# Patient Record
Sex: MALE | Race: WHITE | Employment: FULL TIME | URBAN - METROPOLITAN AREA
[De-identification: names, ages, dates, MRNs, and addresses within clinical notes are randomized per-mention and may not be internally consistent; named-entity substitution may affect disease eponyms.]

---

## 2019-12-23 ENCOUNTER — HOSPITAL ENCOUNTER (EMERGENCY)
Age: 54
Discharge: HOME | End: 2019-12-23
Attending: EMERGENCY MEDICINE
Payer: COMMERCIAL

## 2019-12-23 ENCOUNTER — APPOINTMENT (OUTPATIENT)
Dept: GENERAL RADIOLOGY | Age: 54
End: 2019-12-23
Attending: PHYSICIAN ASSISTANT
Payer: COMMERCIAL

## 2019-12-23 VITALS
WEIGHT: 204 LBS | SYSTOLIC BLOOD PRESSURE: 191 MMHG | DIASTOLIC BLOOD PRESSURE: 95 MMHG | TEMPERATURE: 98 F | HEIGHT: 69 IN | RESPIRATION RATE: 18 BRPM | OXYGEN SATURATION: 99 % | HEART RATE: 77 BPM | BODY MASS INDEX: 30.21 KG/M2

## 2019-12-23 DIAGNOSIS — I10 HYPERTENSION, UNSPECIFIED TYPE: Primary | ICD-10-CM

## 2019-12-23 PROCEDURE — 93005 ELECTROCARDIOGRAM TRACING: CPT

## 2019-12-23 PROCEDURE — 74011250637 HC RX REV CODE- 250/637: Performed by: PHYSICIAN ASSISTANT

## 2019-12-23 PROCEDURE — 71046 X-RAY EXAM CHEST 2 VIEWS: CPT

## 2019-12-23 PROCEDURE — 99284 EMERGENCY DEPT VISIT MOD MDM: CPT

## 2019-12-23 RX ORDER — HYDROCHLOROTHIAZIDE 25 MG/1
25 TABLET ORAL
Status: COMPLETED | OUTPATIENT
Start: 2019-12-23 | End: 2019-12-23

## 2019-12-23 RX ORDER — HYDROCHLOROTHIAZIDE 25 MG/1
25 TABLET ORAL DAILY
Qty: 30 TAB | Refills: 0 | Status: SHIPPED | OUTPATIENT
Start: 2019-12-23 | End: 2020-01-22 | Stop reason: SDUPTHER

## 2019-12-23 RX ADMIN — HYDROCHLOROTHIAZIDE 25 MG: 25 TABLET ORAL at 17:10

## 2019-12-23 NOTE — DISCHARGE INSTRUCTIONS

## 2019-12-23 NOTE — ED TRIAGE NOTES
Patient reports intermittent chest pain and shortness of breath over the last few weeks. Patient was sent by [facility] for A-Flutter reading on his EKG- Patient arrived with a NSR EKG.

## 2019-12-23 NOTE — ED NOTES
Diego ABRAHAM at chairside to review d/c instructions and prescription(s) with patient and self. Opportunity for clarification given. No further questions/concerns voiced at this time. Patient is alert and remains in no acute distress.

## 2019-12-23 NOTE — ED PROVIDER NOTES
62 y.o. male with no significant past medical history who presents from home with chief complaint of chest pain. Patient states for the past 3 or 4 weeks he's been feeling intermittent chest pain and shortness of breath. Patient also reports a few episodes of diaphoresis over the past 4 weeks. Patient went to [facility] today, had a BP of 572 systolic, and was referred her for further evaluation due to an abnormal EKG. Patient presents to [facility] ED c/o intermittent chest pain described as \"dull\" and SOB. Patient denies nausea, vomiting, and fever. There are no other acute medical concerns at this time. Note written by Myesha Addison, as dictated by JARAD Queen at 4:04 PM     The history is provided by the patient. No  was used. No past medical history on file. No past surgical history on file. No family history on file.     Social History     Socioeconomic History    Marital status: Not on file     Spouse name: Not on file    Number of children: Not on file    Years of education: Not on file    Highest education level: Not on file   Occupational History    Not on file   Social Needs    Financial resource strain: Not on file    Food insecurity:     Worry: Not on file     Inability: Not on file    Transportation needs:     Medical: Not on file     Non-medical: Not on file   Tobacco Use    Smoking status: Not on file   Substance and Sexual Activity    Alcohol use: Not on file    Drug use: Not on file    Sexual activity: Not on file   Lifestyle    Physical activity:     Days per week: Not on file     Minutes per session: Not on file    Stress: Not on file   Relationships    Social connections:     Talks on phone: Not on file     Gets together: Not on file     Attends Hindu service: Not on file     Active member of club or organization: Not on file     Attends meetings of clubs or organizations: Not on file     Relationship status: Not on file   Isha Garcia Intimate partner violence:     Fear of current or ex partner: Not on file     Emotionally abused: Not on file     Physically abused: Not on file     Forced sexual activity: Not on file   Other Topics Concern    Not on file   Social History Narrative    Not on file         ALLERGIES: Patient has no known allergies. Review of Systems   Constitutional: Positive for diaphoresis. Negative for chills and fever. Respiratory: Positive for shortness of breath. Cardiovascular: Positive for chest pain. Gastrointestinal: Negative for nausea and vomiting. Musculoskeletal: Negative for myalgias. Neurological: Negative for syncope and light-headedness. All other systems reviewed and are negative. Vitals:    12/23/19 1606   BP: (!) 202/102   Pulse: 81   Resp: 16   Temp: 98 °F (36.7 °C)   SpO2: 100%   Weight: 92.5 kg (204 lb)   Height: 5' 9\" (1.753 m)            Physical Exam  Vitals signs and nursing note reviewed. Constitutional:       General: He is not in acute distress. Appearance: He is well-developed. He is not diaphoretic. HENT:      Head: Normocephalic and atraumatic. Eyes:      Conjunctiva/sclera: Conjunctivae normal.   Neck:      Musculoskeletal: Normal range of motion and neck supple. Cardiovascular:      Rate and Rhythm: Normal rate and regular rhythm. Heart sounds: Normal heart sounds. Pulmonary:      Effort: Pulmonary effort is normal.      Breath sounds: Normal breath sounds. Skin:     General: Skin is warm and dry. Neurological:      Mental Status: He is alert and oriented to person, place, and time.           MDM  Number of Diagnoses or Management Options  Hypertension, unspecified type:      Amount and/or Complexity of Data Reviewed  Tests in the radiology section of CPT®: ordered and reviewed  Discuss the patient with other providers: yes (Dr. Reinaldo Floyd, ED attending)  Independent visualization of images, tracings, or specimens: yes (CXR)    Patient Progress  Patient progress: stable         Procedures  ED EKG interpretation:  Rhythm: normal sinus rhythm; and regular . Rate (approx.): 76; Axis: normal; ST/T wave: normal.  Interpreted by Dr. Promise Lamas, 4:22 PM        62 y.o. male with no significant past medical history who presents from home with chief complaint of chest pain. The patient is well-appearing in no acute distress. EKG without acute ischemic changes or arrhythmia. Labs from [facility] include negative troponin, negative d-dimer, normal renal function. CXR, read by radiology and independently visualized and interpreted by myself, reveals no evidence of pulmonary edema, cardiomegaly or other acute abnormalities. Safe for discharge home with Rx for HCTZ, with instructions for prompt PCP and cardiology follow up. Strict ED return precautions discussed and provided in writing at time of discharge. The patient verbalized understanding and agreement with this plan.

## 2019-12-24 LAB
ATRIAL RATE: 76 BPM
CALCULATED P AXIS, ECG09: 59 DEGREES
CALCULATED R AXIS, ECG10: 1 DEGREES
CALCULATED T AXIS, ECG11: 22 DEGREES
DIAGNOSIS, 93000: NORMAL
P-R INTERVAL, ECG05: 142 MS
Q-T INTERVAL, ECG07: 382 MS
QRS DURATION, ECG06: 88 MS
QTC CALCULATION (BEZET), ECG08: 429 MS
VENTRICULAR RATE, ECG03: 76 BPM

## 2020-01-03 ENCOUNTER — TELEPHONE (OUTPATIENT)
Dept: CARDIOLOGY CLINIC | Age: 55
End: 2020-01-03

## 2020-01-03 NOTE — TELEPHONE ENCOUNTER
Patient referred from ER for chest pain. Have asked PSR to contact patient and schedule new patient appointment with one of our providers for further evaluation.

## 2020-01-13 NOTE — TELEPHONE ENCOUNTER
Per Hue Sterling, PSR     \"Hello,     Left voicemail on patient Home/Mobile requesting a return call to schedule New Patient appointment w/ (ANY) provider \"first available\" for chest pain & HTN, negative troponin & no significant ECG abnormalities. \"

## 2020-01-22 ENCOUNTER — OFFICE VISIT (OUTPATIENT)
Dept: FAMILY MEDICINE CLINIC | Age: 55
End: 2020-01-22

## 2020-01-22 ENCOUNTER — HOSPITAL ENCOUNTER (OUTPATIENT)
Dept: LAB | Age: 55
Discharge: HOME | End: 2020-01-22

## 2020-01-22 VITALS
TEMPERATURE: 97.9 F | SYSTOLIC BLOOD PRESSURE: 142 MMHG | DIASTOLIC BLOOD PRESSURE: 80 MMHG | RESPIRATION RATE: 17 BRPM | HEIGHT: 69 IN | OXYGEN SATURATION: 99 % | HEART RATE: 75 BPM | WEIGHT: 202 LBS | BODY MASS INDEX: 29.92 KG/M2

## 2020-01-22 DIAGNOSIS — I10 ESSENTIAL HYPERTENSION: ICD-10-CM

## 2020-01-22 DIAGNOSIS — Z00.00 ENCOUNTER FOR WELLNESS EXAMINATION: Primary | ICD-10-CM

## 2020-01-22 DIAGNOSIS — Z00.00 ENCOUNTER FOR WELLNESS EXAMINATION: ICD-10-CM

## 2020-01-22 PROBLEM — F41.9 ANXIETY: Status: ACTIVE | Noted: 2020-01-22

## 2020-01-22 PROBLEM — N40.0 BPH (BENIGN PROSTATIC HYPERPLASIA): Status: ACTIVE | Noted: 2020-01-22

## 2020-01-22 PROBLEM — N52.9 ERECTILE DYSFUNCTION: Status: ACTIVE | Noted: 2020-01-22

## 2020-01-22 LAB
ALBUMIN SERPL-MCNC: 4.4 G/DL (ref 3.5–5)
ALBUMIN UR QL STRIP: 10 MG/L
ALBUMIN/GLOB SERPL: 1.6 {RATIO} (ref 1.1–2.2)
ALP SERPL-CCNC: 98 U/L (ref 45–117)
ALT SERPL-CCNC: 30 U/L (ref 12–78)
ANION GAP SERPL CALC-SCNC: 8 MMOL/L (ref 5–15)
AST SERPL-CCNC: 19 U/L (ref 15–37)
BILIRUB SERPL-MCNC: 0.7 MG/DL (ref 0.2–1)
BUN SERPL-MCNC: 10 MG/DL (ref 6–20)
BUN/CREAT SERPL: 11 (ref 12–20)
CALCIUM SERPL-MCNC: 9.7 MG/DL (ref 8.5–10.1)
CHLORIDE SERPL-SCNC: 88 MMOL/L (ref 97–108)
CHOLEST SERPL-MCNC: 209 MG/DL
CO2 SERPL-SCNC: 29 MMOL/L (ref 21–32)
CREAT SERPL-MCNC: 0.91 MG/DL (ref 0.7–1.3)
CREATININE, URINE POC: 200 MG/DL
ERYTHROCYTE [DISTWIDTH] IN BLOOD BY AUTOMATED COUNT: 11.7 % (ref 11.5–14.5)
EST. AVERAGE GLUCOSE BLD GHB EST-MCNC: 103 MG/DL
GLOBULIN SER CALC-MCNC: 2.8 G/DL (ref 2–4)
GLUCOSE SERPL-MCNC: 100 MG/DL (ref 65–100)
HBA1C MFR BLD: 5.2 % (ref 4–5.6)
HCT VFR BLD AUTO: 44.2 % (ref 36.6–50.3)
HCV AB SERPL QL IA: NONREACTIVE
HCV COMMENT,HCGAC: NORMAL
HDLC SERPL-MCNC: 69 MG/DL
HDLC SERPL: 3 {RATIO} (ref 0–5)
HGB BLD-MCNC: 15.3 G/DL (ref 12.1–17)
LDLC SERPL CALC-MCNC: 122.4 MG/DL (ref 0–100)
LIPID PROFILE,FLP: ABNORMAL
MCH RBC QN AUTO: 31.4 PG (ref 26–34)
MCHC RBC AUTO-ENTMCNC: 34.6 G/DL (ref 30–36.5)
MCV RBC AUTO: 90.6 FL (ref 80–99)
MICROALBUMIN/CREAT RATIO POC: <30 MG/G
NRBC # BLD: 0 K/UL (ref 0–0.01)
NRBC BLD-RTO: 0 PER 100 WBC
PLATELET # BLD AUTO: 372 K/UL (ref 150–400)
PMV BLD AUTO: 10.1 FL (ref 8.9–12.9)
POTASSIUM SERPL-SCNC: 4.7 MMOL/L (ref 3.5–5.1)
PROT SERPL-MCNC: 7.2 G/DL (ref 6.4–8.2)
RBC # BLD AUTO: 4.88 M/UL (ref 4.1–5.7)
SODIUM SERPL-SCNC: 125 MMOL/L (ref 136–145)
TRIGL SERPL-MCNC: 88 MG/DL (ref ?–150)
VLDLC SERPL CALC-MCNC: 17.6 MG/DL
WBC # BLD AUTO: 7.8 K/UL (ref 4.1–11.1)

## 2020-01-22 RX ORDER — TAMSULOSIN HYDROCHLORIDE 0.4 MG/1
CAPSULE ORAL
COMMUNITY
Start: 2020-01-16

## 2020-01-22 RX ORDER — ALPRAZOLAM 2 MG/1
TABLET, EXTENDED RELEASE ORAL
COMMUNITY
Start: 2020-01-19

## 2020-01-22 RX ORDER — HYDROCHLOROTHIAZIDE 25 MG/1
25 TABLET ORAL DAILY
Qty: 90 TAB | Refills: 0 | Status: SHIPPED | OUTPATIENT
Start: 2020-01-22 | End: 2020-02-25

## 2020-01-22 RX ORDER — BISMUTH SUBSALICYLATE 262 MG
1 TABLET,CHEWABLE ORAL DAILY
COMMUNITY

## 2020-01-22 RX ORDER — TADALAFIL 20 MG/1
20 TABLET ORAL AS NEEDED
COMMUNITY

## 2020-01-22 NOTE — PROGRESS NOTES
Chief Complaint   Patient presents with   Casper Shaker Establish Care     1. Have you been to the ER, urgent care clinic since your last visit? Hospitalized since your last visit? No    2. Have you seen or consulted any other health care providers outside of the [address] [place] [address] since your last visit? Include any pap smears or colon screening.  No [facility]--iiv84--793/110    Had some high bp ---hctz    Had a [facility] test yesterday[facility]      avg bp--138/92    Scheduled for an echo and calcium    tdap--2018    Colonoscopy--referral needed    Shingles vaccine--would like script    Fasting for labwork

## 2020-01-22 NOTE — PROGRESS NOTES
Subjective:   Jeane Inman is an 62 y.o. male who presents for complete physical exam. H/o BPH, anxiety (followed by [facility]),  and newly diagnosed HTN started on HCTZ 25mg diagnosed after a visit to the ED 1 month ago for chest pain. Pt is new to the practice and is currently being follow by Dr. Maximilian Maciel at [facility] after his recent ED visit. He had a normal stress test yesterday and is scheduled for an Echo and calcium test the first week of February. Pt checks his BP at home and states it has been significantly improved since starting the HCTZ. Today patient requests a referral for colonoscopy, a script for Singles vaccine and is fasting for blood work. He also needs a refill on the HCTZ. Diet: started an organic diet following his son's recommendations of avoiding fried foods, sweets and eating more of a mediterranean diet with lean proteins, fruits and vegetables. Also working on portion control as well. 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 Work: works at [employer]. Immunizations - reviewed: There is no immunization history on file for this patient. Flu: declined   Tdap: 2018  Zostervax (47yo): will give script today. Health Maintenance reviewed -  Colonoscopy: referral given today    Hepatitis C testing: will test today       Allergies - reviewed:   No Known Allergies      Medications - reviewed:  Current Outpatient Medications   Medication Sig    tamsulosin (FLOMAX) 0.4 mg capsule     ALPRAZolam (XANAX XR) 2 mg XR tablet     tadalafil (CIALIS) 20 mg tablet Take 20 mg by mouth as needed for Erectile Dysfunction.  multivitamin (ONE A DAY) tablet Take 1 Tab by mouth daily.  fish oil-omega-3 fatty acids (FISH OIL) 300-500 mg cap Take  by mouth.     varicella-zoster recombinant, PF, (SHINGRIX, PF,) 50 mcg/0.5 mL susr injection 0.5 mL by IntraMUSCular route once for 1 dose.  hydroCHLOROthiazide (HYDRODIURIL) 25 mg tablet Take 1 Tab by mouth daily for 90 days. Indications: high blood pressure     No current facility-administered medications for this visit. Past Medical History - reviewed:  Past Medical History:   Diagnosis Date    BPH (benign prostatic hyperplasia) 2012    Hypertension          Past Surgical History - reviewed:  History reviewed. No pertinent surgical history. Family History - reviewed:  Family History   Problem Relation Age of Onset    Lung Cancer Mother     Hypertension Father     Dementia Father          Social History - reviewed:  Social History     Socioeconomic History    Marital status: UNKNOWN     Spouse name: Not on file    Number of children: Not on file    Years of education: Not on file    Highest education level: Not on file   Occupational History     Comment: [employer] Financial resource strain: Not on file    Food insecurity:     Worry: Not on file     Inability: Not on file   Sophie & Juliet needs:     Medical: Not on file     Non-medical: Not on file   Tobacco Use    Smoking status: Former Smoker     Types: Cigarettes    Smokeless tobacco: Never Used    Tobacco comment: 1/2 pack per day for 10 years.  stopped after college   Substance and Sexual Activity    Alcohol use: Not on file     Comment: 1 glass of wine or 1 light beer a few times per week and then ~5 glasses of wine once on the weekend     Drug use: Never    Sexual activity: Yes     Partners: Female     Comment:  to his wife 32 years    Lifestyle    Physical activity:     Days per week: Not on file     Minutes per session: Not on file    Stress: Not on file   Relationships    Social connections:     Talks on phone: Not on file     Gets together: Not on file     Attends Yazdanism service: Not on file     Active member of club or organization: Not on file     Attends meetings of clubs or organizations: Not on file     Relationship status: Not on file    Intimate partner violence:     Fear of current or ex partner: Not on file     Emotionally abused: Not on file     Physically abused: Not on file     Forced sexual activity: Not on file   Other Topics Concern    Not on file   Social History Narrative    Not on file         Review of Systems   Review of Systems   Constitutional: Negative for chills and fever. HENT: Negative for congestion and sinus pain. Eyes: Negative for double vision. Respiratory: Negative for cough and shortness of breath. Cardiovascular: Negative for chest pain and palpitations. Gastrointestinal: Negative for abdominal pain, nausea and vomiting. Genitourinary: Negative for dysuria and hematuria. Musculoskeletal: Negative for myalgias. Skin: Negative for rash. Neurological: Negative for dizziness and headaches.            Objective:     Visit Vitals  /80   Pulse 75   Temp 97.9 °F (36.6 °C) (Oral)   Resp 17   Ht 5' 9\" (1.753 m)   Wt 202 lb (91.6 kg)   SpO2 99%   BMI 29.83 kg/m²       General appearance - alert, well appearing, and in no distress  Eyes - sclera anicteric and without injection   Ears - bilateral TM's and external ear canals normal  Nose - normal and patent, no erythema, discharge or polyps  Mouth - mucous membranes moist, pharynx normal without lesions  Neck - supple, no significant adenopathy, thyroid exam: thyroid is normal in size without nodules or tenderness  Chest - clear to auscultation, no wheezes, rales or rhonchi, symmetric air entry  Heart - normal rate, regular rhythm, normal S1, S2, no murmurs, rubs, clicks or gallops  Abdomen - soft, nontender, nondistended, no masses or organomegaly  Neurological - alert, oriented, normal speech, no focal findings or movement disorder noted  Musculoskeletal - no joint tenderness, deformity or swelling  Extremities - no pedal edema noted  Skin - normal coloration and turgor, no rashes, no suspicious skin lesions noted      Assessment:       ICD-10-CM ICD-9-CM    1. Encounter for wellness examination Z00.00 V70.0 CBC W/O DIFF      METABOLIC PANEL, COMPREHENSIVE      LIPID PANEL      HEMOGLOBIN A1C WITH EAG      REFERRAL TO GASTROENTEROLOGY      varicella-zoster recombinant, PF, (SHINGRIX, PF,) 50 mcg/0.5 mL susr injection      HEPATITIS C AB      AMB POC URINE, MICROALBUMIN, SEMIQUANT (3 RESULTS)   2. Essential hypertension I10 401.9 hydroCHLOROthiazide (HYDRODIURIL) 25 mg tablet           Plan:   Ana Wilkins is a 62 y.o. male here as a new patient to establish care and have a complete wellness exam.  H/o BPH, anxiety (followed by [facility]),  and newly diagnosed HTN started on HCTZ 25mg. 1. Encounter for wellness examination  - CBC W/O DIFF; Future  - METABOLIC PANEL, COMPREHENSIVE; Future  - LIPID PANEL; Future  - HEMOGLOBIN A1C WITH EAG; Future  - REFERRAL TO GASTROENTEROLOGY for Screening Colonoscopy. - varicella-zoster recombinant, PF, (SHINGRIX, PF,) 50 mcg/0.5 mL susr injection; 0.5 mL by IntraMUSCular route once for 1 dose. Dispense: 0.5 mL; Refill: 0  - HEPATITIS C AB; Future  - AMB POC URINE, MICROALBUMIN, SEMIQUANT (3 RESULTS)    2. Essential hypertension   Pt should continue to check his BP at home. His monitor keeps a monthly record of the readings. Asked that if he notices any discrepancy's in his readings to please call the office and schedule an appointment for further management. - hydroCHLOROthiazide (HYDRODIURIL) 25 mg tablet; Take 1 Tab by mouth daily for 90 days. Indications: high blood pressure  Dispense: 90 Tab; Refill: 0        Follow up: 6 months- HTN    I have discussed the diagnosis with the patient and the intended plan as seen in the above orders. The patient has received an after-visit summary and questions were answered concerning future plans. I have discussed medication side effects and warnings with the patient as well. Informed pt to return to the office if new symptoms arise.     Patient discussed with Dr. Kayce Roche (attending)     Kashmir Salvador DO  Family Medicine Attending

## 2020-01-23 ENCOUNTER — TELEPHONE (OUTPATIENT)
Dept: FAMILY MEDICINE CLINIC | Age: 55
End: 2020-01-23

## 2020-01-23 DIAGNOSIS — E87.1 HYPONATREMIA: ICD-10-CM

## 2020-01-23 DIAGNOSIS — I10 ESSENTIAL HYPERTENSION: Primary | ICD-10-CM

## 2020-01-23 RX ORDER — LISINOPRIL 10 MG/1
10 TABLET ORAL DAILY
Qty: 30 TAB | Refills: 1 | Status: SHIPPED | OUTPATIENT
Start: 2020-01-23 | End: 2020-03-14

## 2020-01-23 NOTE — PROGRESS NOTES
Micro Albumin negative. I personally reviewed labs and labs discussed with patient.   all questions were answered

## 2020-01-23 NOTE — PROGRESS NOTES
-Ha1c is normal and does not show signs of diabetes. -Hep C is negative   -Complete Blood count was normal too and did not show signs of anemia, infection or low platelets.      -cholesterol was borderline high. continue to adhere to new mediterranian diet, watching carb and sugar intake and  alcohol intake. Continue to exercise as tolerated. will hold off on starting any cholesterol lowering medication until his Echocardiogram first week in February. I am in agreement with the cardiologist, if there is any plaque seen on the Echo then it would be a wise decision to start on a statin for cholesterol lowering means.      - CMP showed moderately low sodium at 125 and low chloride at 88. These are common side effects of taking HCTZ and likely the etiology of the abnormal lab results. Patient is w/o symptoms w/ normal kidney and liver function and other electrolytes are all within normal.  He is to stop the HCTZ and start on Lisinopril 10mg every day. He will drop by for a blood draw in 1 week to recheck a BMP. Patient was counseled that he is to go to the ED if he has any signs of headache, dizziness, trouble with your vision, chest pain, shortness of breath, nausea or vomiting, feeling off balance or trouble walking. I personally reviewed labs and labs discussed with patient.   all questions were answered

## 2020-01-23 NOTE — TELEPHONE ENCOUNTER
6:33 PM  Spoke with patient over the phone and discussed his lab results. See below.      -Ha1c is normal and does not show signs of diabetes.    -Hep C is negative   -Complete Blood count was normal too and did not show signs of anemia, infection or low platelets.    -cholesterol was borderline high. continue to adhere to new mediterranian diet, watching carb and sugar intake and  alcohol intake.  Continue to exercise as tolerated.  will hold off on starting any cholesterol lowering medication until his Echocardiogram first week in February.  I am in agreement with the cardiologist, if there is any plaque seen on the Echo then it would be a wise decision to start on a statin for cholesterol lowering means.      - CMP showed moderately low sodium at 125 and low chloride at 88.  These are common side effects of taking HCTZ and likely the etiology of the abnormal lab results (note: chart review from care Everywhere shows CMP from 12/23/19 with Na of 133 and Cl of 97).  Patient is w/o symptoms w/ normal kidney and liver function and other electrolytes are all within normal. I reviewed his EKG from his hospital stay in December 2019 at it was wnl with NSR and normal QTc. Finally as discussed Pt states he had a normal Stress Test earlier this week, results were requested will be faxed to our office. He is to stop the HCTZ and start on Lisinopril 10mg every day.   He will drop by for a blood draw in 1 week to recheck a BMP.  Patient was counseled that he is to go to the ED if he has any signs of headache, dizziness, trouble with your vision, chest pain, shortness of breath, nausea or vomiting, feeling off balance or trouble walking. He will return in 3-4 weeks for  Follow up on his BP and to discussed the results and recommendations from his cardiologist after he has the ECHO. Aretha Lopes D.O.    Family Medicine Resident PGY1

## 2020-02-04 ENCOUNTER — LAB ONLY (OUTPATIENT)
Dept: FAMILY MEDICINE CLINIC | Age: 55
End: 2020-02-04

## 2020-02-04 ENCOUNTER — HOSPITAL ENCOUNTER (OUTPATIENT)
Dept: LAB | Age: 55
Discharge: HOME | End: 2020-02-04

## 2020-02-04 DIAGNOSIS — E87.1 HYPONATREMIA: ICD-10-CM

## 2020-02-04 LAB
ANION GAP SERPL CALC-SCNC: 6 MMOL/L (ref 5–15)
BUN SERPL-MCNC: 19 MG/DL (ref 6–20)
BUN/CREAT SERPL: 21 (ref 12–20)
CALCIUM SERPL-MCNC: 9.3 MG/DL (ref 8.5–10.1)
CHLORIDE SERPL-SCNC: 97 MMOL/L (ref 97–108)
CO2 SERPL-SCNC: 27 MMOL/L (ref 21–32)
CREAT SERPL-MCNC: 0.91 MG/DL (ref 0.7–1.3)
GLUCOSE SERPL-MCNC: 96 MG/DL (ref 65–100)
POTASSIUM SERPL-SCNC: 5 MMOL/L (ref 3.5–5.1)
SODIUM SERPL-SCNC: 130 MMOL/L (ref 136–145)

## 2020-02-07 NOTE — PROGRESS NOTES
Complete blood count shows improved sodium since stopping HCTZ and starting on Lisinopril for blood pressure management. Spoke with patient and relayed the results. He states his BP has been well controlled since switching and he remains asymptomatic.

## 2020-02-25 ENCOUNTER — OFFICE VISIT (OUTPATIENT)
Dept: FAMILY MEDICINE CLINIC | Age: 55
End: 2020-02-25

## 2020-02-25 VITALS
OXYGEN SATURATION: 99 % | HEART RATE: 68 BPM | DIASTOLIC BLOOD PRESSURE: 79 MMHG | WEIGHT: 195 LBS | HEIGHT: 69 IN | RESPIRATION RATE: 16 BRPM | SYSTOLIC BLOOD PRESSURE: 130 MMHG | BODY MASS INDEX: 28.88 KG/M2 | TEMPERATURE: 97.7 F

## 2020-02-25 DIAGNOSIS — I10 ESSENTIAL HYPERTENSION: Primary | ICD-10-CM

## 2020-02-25 DIAGNOSIS — E87.1 HYPONATREMIA: ICD-10-CM

## 2020-02-25 RX ORDER — ROSUVASTATIN CALCIUM 10 MG/1
10 TABLET, COATED ORAL
COMMUNITY

## 2020-02-25 NOTE — PATIENT INSTRUCTIONS
High Blood Pressure: Care Instructions  Overview    It's normal for blood pressure to go up and down throughout the day. But if it stays up, you have high blood pressure. Another name for high blood pressure is hypertension. Despite what a lot of people think, high blood pressure usually doesn't cause headaches or make you feel dizzy or lightheaded. It usually has no symptoms. But it does increase your risk of stroke, heart attack, and other problems. You and your doctor will talk about your risks of these problems based on your blood pressure. Your doctor will give you a goal for your blood pressure. Your goal will be based on your health and your age. Lifestyle changes, such as eating healthy and being active, are always important to help lower blood pressure. You might also take medicine to reach your blood pressure goal.  Follow-up care is a key part of your treatment and safety. Be sure to make and go to all appointments, and call your doctor if you are having problems. It's also a good idea to know your test results and keep a list of the medicines you take. How can you care for yourself at home? Medical treatment  · If you stop taking your medicine, your blood pressure will go back up. You may take one or more types of medicine to lower your blood pressure. Be safe with medicines. Take your medicine exactly as prescribed. Call your doctor if you think you are having a problem with your medicine. · Talk to your doctor before you start taking aspirin every day. Aspirin can help certain people lower their risk of a heart attack or stroke. But taking aspirin isn't right for everyone, because it can cause serious bleeding. · See your doctor regularly. You may need to see the doctor more often at first or until your blood pressure comes down. · If you are taking blood pressure medicine, talk to your doctor before you take decongestants or anti-inflammatory medicine, such as ibuprofen.  Some of these medicines can raise blood pressure. · Learn how to check your blood pressure at home. Lifestyle changes  · Stay at a healthy weight. This is especially important if you put on weight around the waist. Losing even 10 pounds can help you lower your blood pressure. · If your doctor recommends it, get more exercise. Walking is a good choice. Bit by bit, increase the amount you walk every day. Try for at least 30 minutes on most days of the week. You also may want to swim, bike, or do other activities. · Avoid or limit alcohol. Talk to your doctor about whether you can drink any alcohol. · Try to limit how much sodium you eat to less than 2,300 milligrams (mg) a day. Your doctor may ask you to try to eat less than 1,500 mg a day. · Eat plenty of fruits (such as bananas and oranges), vegetables, legumes, whole grains, and low-fat dairy products. · Lower the amount of saturated fat in your diet. Saturated fat is found in animal products such as milk, cheese, and meat. Limiting these foods may help you lose weight and also lower your risk for heart disease. · Do not smoke. Smoking increases your risk for heart attack and stroke. If you need help quitting, talk to your doctor about stop-smoking programs and medicines. These can increase your chances of quitting for good. When should you call for help? Call  911 anytime you think you may need emergency care. This may mean having symptoms that suggest that your blood pressure is causing a serious heart or blood vessel problem. Your blood pressure may be over 180/120.   For example, call  911 if:    · You have symptoms of a heart attack. These may include:  ? Chest pain or pressure, or a strange feeling in the chest.  ? Sweating. ? Shortness of breath. ? Nausea or vomiting. ? Pain, pressure, or a strange feeling in the back, neck, jaw, or upper belly or in one or both shoulders or arms. ? Lightheadedness or sudden weakness.   ? A fast or irregular heartbeat.     · You have symptoms of a stroke. These may include:  ? Sudden numbness, tingling, weakness, or loss of movement in your face, arm, or leg, especially on only one side of your body. ? Sudden vision changes. ? Sudden trouble speaking. ? Sudden confusion or trouble understanding simple statements. ? Sudden problems with walking or balance. ? A sudden, severe headache that is different from past headaches.     · You have severe back or belly pain.    Do not wait until your blood pressure comes down on its own. Get help right away.   Call your doctor now or seek immediate care if:    · Your blood pressure is much higher than normal (such as 180/120 or higher), but you don't have symptoms.     · You think high blood pressure is causing symptoms, such as:  ? Severe headache.  ? Blurry vision.    Watch closely for changes in your health, and be sure to contact your doctor if:    · Your blood pressure measures higher than your doctor recommends at least 2 times. That means the top number is higher or the bottom number is higher, or both.     · You think you may be having side effects from your blood pressure medicine. Where can you learn more? Go to http://yolanda-ariel.info/. Enter J423 in the search box to learn more about \"High Blood Pressure: Care Instructions. \"  Current as of: April 9, 2019  Content Version: 12.2  © 4957-3807 [employer] [facility] [employer] [facility]. Care instructions adapted under license by [employer] (which disclaims liability or warranty for this information). If you have questions about a medical condition or this instruction, always ask your healthcare professional. [place] [address] any warranty or liability for your use of this information.

## 2020-02-25 NOTE — PROGRESS NOTES
[address] [facility] [address] [place] [address]   Office [phone], Fax [phone]    Subjective:     Chief Complaint   Patient presents with    Blood Pressure Check     avg 128/84     History provided by patient     HPI:  Eamon Sanabria is a 62 y.o. [place]  male with past medical history of HTN and  presents for   Chief Complaint   Patient presents with    Blood Pressure Check     avg 128/84       Follow up on his BP.  1 month ago I saw Pt for his Preventative physical.  At the time he was taking HCTZ for his blood pressure. His sodium on exam was down to 125. I had him stop the HCTZ and start Lisinopril 10mg daily. [facility]. Pt states that he feels less dehydrated since starting the lisinopril and denies any side effects. He has a home BP cuff and checks his pressure at least once a day. He also has continued the [facility] [place] diet that he started January 1st with his son  He has almost completely cut out red meat, cut back on carbs and has made great progress in cutting out beer or switching to light beer only. He is down 7 pounds in the past month and 10 pounds since Christmas. He followed up with Cardiology Dr. Saniya Mendez with [facility] and had a normal Stress Echo on 1/21/20 was wnl with an EF of 64%. His Calcium score was 100.58 and Dr. Manuel Weiss started him on Rosuvastatin 10mg once a day in the AM.  At this time there are no other complaints and patient feels well. SocHx. - Denies smoking (former smoker, quit after college), illcit drug use. Drinks light beer occasionally. - Sexually active: yes with his wife       Review of Systems   Constitutional: Negative for chills and fever. Eyes: Negative for double vision. Respiratory: Negative for cough and shortness of breath. Cardiovascular: Negative for chest pain and palpitations. Gastrointestinal: Negative for abdominal pain, nausea and vomiting. Genitourinary: Negative for dysuria. Musculoskeletal: Negative for myalgias. Neurological: Negative for dizziness and headaches. Objective:     Visit Vitals  /79   Pulse 68   Temp 97.7 °F (36.5 °C) (Oral)   Resp 16   Ht 5' 9\" (1.753 m)   Wt 195 lb (88.5 kg)   SpO2 99%   BMI 28.80 kg/m²          Physical Exam  Vitals signs and nursing note reviewed. Constitutional:       General: He is not in acute distress. Appearance: He is well-developed. HENT:      Head: Normocephalic and atraumatic. Eyes:      Conjunctiva/sclera: Conjunctivae normal.   Cardiovascular:      Rate and Rhythm: Normal rate and regular rhythm. Heart sounds: Normal heart sounds. No murmur. No friction rub. No gallop. Pulmonary:      Effort: Pulmonary effort is normal.      Breath sounds: Normal breath sounds. No wheezing. Musculoskeletal: Normal range of motion. Skin:     General: Skin is warm and dry. Findings: No rash. Neurological:      Mental Status: He is alert. Assessment and orders:     1. Essential hypertension:  Well controlled at this time. Asymptomatic   -continue checking BP intermittently and keeping track of readings. Asked that he call our office or go to the ER if he finds any abnormalities or starts having any symptoms of HA, dizziness, SOB, chest pain, or cough. -continue Lisinopril 10mg once daily     2. Hyponatremia: Pt remains asymptomatic and last Na was 130 (4/5/79)    - METABOLIC PANEL, BASIC; Future  -The lab was closed by the time we finished his visit. Pt will come back on 2/28 for repeat BMP to follow up on sodium and ensure it continues to improve. Follow Up: 6 months for f/u HTN      Pt was discussed with Dr. Renée Crocker (attending physician). I have reviewed patient medical and social history and medications. I have reviewed pertinent labs results and other data. I have discussed the diagnosis with the patient and the intended plan as seen in the above orders.  The patient has received an after-visit summary and questions were answered concerning future plans. I have discussed medication side effects and warnings with the patient as well.     Lizy Benton, DO  Resident [facility]  02/25/20

## 2020-02-25 NOTE — PROGRESS NOTES
Chief Complaint   Patient presents with    Blood Pressure Check     avg 128/84     1. Have you been to the ER, urgent care clinic since your last visit? Hospitalized since your last visit? No    2. Have you seen or consulted any other health care providers outside of the [address] [place] [address] since your last visit? Include any pap smears or colon screening.  No

## 2020-03-04 ENCOUNTER — LAB ONLY (OUTPATIENT)
Dept: FAMILY MEDICINE CLINIC | Age: 55
End: 2020-03-04

## 2020-03-04 ENCOUNTER — HOSPITAL ENCOUNTER (OUTPATIENT)
Dept: LAB | Age: 55
Discharge: HOME | End: 2020-03-04

## 2020-03-04 DIAGNOSIS — E87.1 HYPONATREMIA: ICD-10-CM

## 2020-03-04 LAB
ANION GAP SERPL CALC-SCNC: 7 MMOL/L (ref 5–15)
BUN SERPL-MCNC: 13 MG/DL (ref 6–20)
BUN/CREAT SERPL: 15 (ref 12–20)
CALCIUM SERPL-MCNC: 9.5 MG/DL (ref 8.5–10.1)
CHLORIDE SERPL-SCNC: 100 MMOL/L (ref 97–108)
CO2 SERPL-SCNC: 27 MMOL/L (ref 21–32)
CREAT SERPL-MCNC: 0.89 MG/DL (ref 0.7–1.3)
GLUCOSE SERPL-MCNC: 94 MG/DL (ref 65–100)
POTASSIUM SERPL-SCNC: 4.9 MMOL/L (ref 3.5–5.1)
SODIUM SERPL-SCNC: 134 MMOL/L (ref 136–145)

## 2020-03-09 NOTE — PROGRESS NOTES
Sodium continues to improve since changing BP medication ~2 months ago. Pt will follow up in 6 months for BP follow up.

## 2020-03-10 ENCOUNTER — TELEPHONE (OUTPATIENT)
Dept: FAMILY MEDICINE CLINIC | Age: 55
End: 2020-03-10

## 2020-03-10 NOTE — TELEPHONE ENCOUNTER
Telephone call on 03/10/2020 by me, identifier on phone, so I left a detailed message. The 01/22/2020 visit statement shows a deductible on his insurance policy that we have in the system. Also since we cannot see the billing on the lab side, I suggested that he call the number on that statement to question the billing department for the lab, but it too could be a deductible that his policy has for this new year. I gave him our office number again if he has any other questions.

## 2020-03-10 NOTE — TELEPHONE ENCOUNTER
----- Message from Juan Antonio Navas sent at 3/10/2020  9:48 AM EDT -----  Regarding: FW: Karlie Eisenberg    ----- Message -----  From: Dale Dunham  Sent: 3/10/2020   9:30 AM EDT  To: [facility]  Subject: [place] Message/Vendor Calls    Caller's first and last name:(patient))     Reason for call: concern about billing       Callback required yes/no and why: Yes,  billing amount      Best contact number(s): [phone] ( leave message if no answer)      Details to clarify the request: Pt has question about billing from date of services n Jan 22,2020 in the amount of $272  and lab from [facility] for labs .  Pt would like a call back on this claim       Prakash Gunderson

## 2020-03-14 DIAGNOSIS — I10 ESSENTIAL HYPERTENSION: ICD-10-CM

## 2020-03-14 RX ORDER — LISINOPRIL 10 MG/1
TABLET ORAL
Qty: 30 TAB | Refills: 1 | Status: SHIPPED | OUTPATIENT
Start: 2020-03-14 | End: 2020-05-07

## 2020-05-07 DIAGNOSIS — I10 ESSENTIAL HYPERTENSION: ICD-10-CM

## 2020-05-07 RX ORDER — LISINOPRIL 10 MG/1
TABLET ORAL
Qty: 30 TAB | Refills: 1 | Status: SHIPPED | OUTPATIENT
Start: 2020-05-07 | End: 2020-07-02

## 2020-07-02 DIAGNOSIS — I10 ESSENTIAL HYPERTENSION: ICD-10-CM

## 2020-07-02 RX ORDER — LISINOPRIL 10 MG/1
TABLET ORAL
Qty: 30 TAB | Refills: 1 | Status: SHIPPED | OUTPATIENT
Start: 2020-07-02 | End: 2020-08-29 | Stop reason: SDUPTHER

## 2020-07-31 ENCOUNTER — HOSPITAL ENCOUNTER (EMERGENCY)
Age: 55
Discharge: HOME | End: 2020-07-31
Attending: EMERGENCY MEDICINE | Admitting: EMERGENCY MEDICINE
Payer: COMMERCIAL

## 2020-07-31 VITALS
WEIGHT: 185 LBS | DIASTOLIC BLOOD PRESSURE: 110 MMHG | OXYGEN SATURATION: 97 % | HEIGHT: 69 IN | TEMPERATURE: 98.3 F | BODY MASS INDEX: 27.4 KG/M2 | RESPIRATION RATE: 16 BRPM | SYSTOLIC BLOOD PRESSURE: 170 MMHG | HEART RATE: 70 BPM

## 2020-07-31 DIAGNOSIS — R04.0 EPISTAXIS: Primary | ICD-10-CM

## 2020-07-31 PROCEDURE — 75810000284 HC CNTRL NASAL HEMORHRAGE SIMPLE

## 2020-07-31 PROCEDURE — 74011000250 HC RX REV CODE- 250: Performed by: EMERGENCY MEDICINE

## 2020-07-31 PROCEDURE — 99283 EMERGENCY DEPT VISIT LOW MDM: CPT

## 2020-07-31 PROCEDURE — 74011250637 HC RX REV CODE- 250/637: Performed by: EMERGENCY MEDICINE

## 2020-07-31 RX ORDER — LIDOCAINE HYDROCHLORIDE 20 MG/ML
15 SOLUTION OROPHARYNGEAL
Status: COMPLETED | OUTPATIENT
Start: 2020-07-31 | End: 2020-07-31

## 2020-07-31 RX ORDER — SILVER NITRATE 38.21; 12.74 MG/1; MG/1
1 STICK TOPICAL
Status: DISCONTINUED | OUTPATIENT
Start: 2020-07-31 | End: 2020-07-31 | Stop reason: HOSPADM

## 2020-07-31 RX ORDER — OXYMETAZOLINE HCL 0.05 %
2 SPRAY, NON-AEROSOL (ML) NASAL
Status: DISCONTINUED | OUTPATIENT
Start: 2020-07-31 | End: 2020-07-31 | Stop reason: HOSPADM

## 2020-07-31 RX ADMIN — LIDOCAINE HYDROCHLORIDE 15 ML: 20 SOLUTION ORAL; TOPICAL at 01:56

## 2020-07-31 RX ADMIN — Medication 2 SPRAY: at 01:37

## 2020-07-31 NOTE — ED TRIAGE NOTES
Triage: Pt advises that he had a nose bleed at 0900 this am and had one at 2100 and at 2315. Pt advises this is the first time he has ever had a nose bleed.

## 2020-07-31 NOTE — ED PROVIDER NOTES
Epistaxis    This is a new problem. The current episode started 6 to 12 hours ago. The problem occurs constantly. The problem has been resolved. The problem is associated with nothing. The bleeding has been from the left nare. Treatments tried: Toilet paper. The treatment provided no relief. His past medical history does not include bleeding disorder, colds, sinus problems, frequent nosebleeds, cancer, HTN, allergies or trauma. Past Medical History:   Diagnosis Date    BPH (benign prostatic hyperplasia) 2012    Hypertension        No past surgical history on file. Family History:   Problem Relation Age of Onset    Lung Cancer Mother     Hypertension Father     Dementia Father        Social History     Socioeconomic History    Marital status:      Spouse name: Not on file    Number of children: Not on file    Years of education: Not on file    Highest education level: Not on file   Occupational History     Comment: [employer] Financial resource strain: Not on file    Food insecurity     Worry: Not on file     Inability: Not on file   [email or web address] needs     Medical: Not on file     Non-medical: Not on file   Tobacco Use    Smoking status: Former Smoker     Types: Cigarettes    Smokeless tobacco: Never Used    Tobacco comment: 1/2 pack per day for 10 years.  stopped after college   Substance and Sexual Activity    Alcohol use: Not on file     Comment: 1 glass of wine or 1 light beer a few times per week and then ~5 glasses of wine once on the weekend     Drug use: Never    Sexual activity: Yes     Partners: Female     Comment:  to his wife 32 years    Lifestyle    Physical activity     Days per week: Not on file     Minutes per session: Not on file    Stress: Not on file   Relationships    Social connections     Talks on phone: Not on file     Gets together: Not on file     Attends [employer] service: Not on file     Active member of club or organization: Not on file     Attends meetings of clubs or organizations: Not on file     Relationship status: Not on file    Intimate partner violence     Fear of current or ex partner: Not on file     Emotionally abused: Not on file     Physically abused: Not on file     Forced sexual activity: Not on file   Other Topics Concern    Not on file   Social History Narrative    Not on file         ALLERGIES: Hydrochlorothiazide    Review of Systems   HENT: Positive for nosebleeds. Neurological: Negative for dizziness, light-headedness and headaches. All other systems reviewed and are negative. Vitals:    07/31/20 0055   BP: (!) 170/110   Pulse: 70   Resp: 16   Temp: 98.3 °F (36.8 °C)   SpO2: 97%   Weight: 83.9 kg (185 lb)   Height: 5' 9\" (1.753 m)            Physical Exam  Constitutional:       General: He is not in acute distress. Appearance: Normal appearance. HENT:      Head: Normocephalic and atraumatic. Nose: No signs of injury, nasal tenderness or congestion. Right Nostril: No foreign body or epistaxis. Left Nostril: Epistaxis (resolved) present. No foreign body, septal hematoma or occlusion. Mouth/Throat:      Pharynx: Oropharynx is clear. Eyes:      Conjunctiva/sclera: Conjunctivae normal.   Neck:      Musculoskeletal: Neck supple. Cardiovascular:      Rate and Rhythm: Normal rate. Pulses: Normal pulses. Pulmonary:      Effort: Pulmonary effort is normal. No respiratory distress. Musculoskeletal: Normal range of motion. Skin:     General: Skin is warm. Capillary Refill: Capillary refill takes less than 2 seconds. Coloration: Skin is not pale. Neurological:      General: No focal deficit present. Mental Status: He is alert. MDM  Number of Diagnoses or Management Options  Epistaxis:   Diagnosis management comments: Patient presented to ER with report of epistaxis from the left nare that occurred 3 times a day.   Patient reports using toilet paper to try and stop the bleeding but did not apply any direct pressure. Patient denies any trauma or injuries to the nose no nose picking and no anticoagulation. Patient reports that he is indoors a lot with his A/C running. After using Afrin and evaluating the nose no areas of bleeding stigmata that would indicate cauterization. I discussed treatment for epistaxis while the patient is at home. Discussed using a water-based lubricant to apply to the naris to prevent mucous membrane cracking. And given follow-up nation for ENT as needed           Epistaxis Management  Performed by: Romeo Cabral MD  Authorized by: Romeo Cabral MD     Consent:     Consent obtained:  Verbal    Consent given by:  Patient    Risks discussed:  Bleeding, infection, nasal injury and pain    Alternatives discussed:  No treatment and referral  Anesthesia (see MAR for exact dosages): Anesthesia method:  Topical application    Topical anesthetic:  Lidocaine gel  Procedure details:     Treatment site:  L anterior    Repair method: afrin. Treatment complexity:  Limited    Treatment episode: initial    Post-procedure details:     Assessment:  Bleeding stopped    Patient tolerance of procedure:   Tolerated well, no immediate complications

## 2020-08-29 DIAGNOSIS — I10 ESSENTIAL HYPERTENSION: ICD-10-CM

## 2020-08-29 RX ORDER — LISINOPRIL 10 MG/1
TABLET ORAL
Qty: 30 TAB | Refills: 1 | Status: SHIPPED | OUTPATIENT
Start: 2020-08-29 | End: 2020-10-28

## 2021-12-10 DIAGNOSIS — I10 ESSENTIAL HYPERTENSION: ICD-10-CM

## 2021-12-10 RX ORDER — LISINOPRIL 10 MG/1
TABLET ORAL
Qty: 30 TABLET | Refills: 1 | OUTPATIENT
Start: 2021-12-10 | End: 2022-03-10

## 2022-03-19 PROBLEM — F41.9 ANXIETY: Status: ACTIVE | Noted: 2020-01-22

## 2022-03-19 PROBLEM — N52.9 ERECTILE DYSFUNCTION: Status: ACTIVE | Noted: 2020-01-22

## 2022-03-19 PROBLEM — I10 ESSENTIAL HYPERTENSION: Status: ACTIVE | Noted: 2020-01-22

## 2022-03-19 PROBLEM — N40.0 BPH (BENIGN PROSTATIC HYPERPLASIA): Status: ACTIVE | Noted: 2020-01-22

## 2023-05-10 RX ORDER — ALPRAZOLAM 2 MG/1
TABLET, EXTENDED RELEASE ORAL
COMMUNITY
Start: 2020-01-19

## 2023-05-10 RX ORDER — ROSUVASTATIN CALCIUM 10 MG/1
10 TABLET, COATED ORAL NIGHTLY
COMMUNITY

## 2023-05-10 RX ORDER — TAMSULOSIN HYDROCHLORIDE 0.4 MG/1
CAPSULE ORAL
COMMUNITY
Start: 2020-01-16

## 2023-05-10 RX ORDER — TADALAFIL 20 MG/1
TABLET ORAL PRN
COMMUNITY